# Patient Record
Sex: MALE | Race: WHITE | NOT HISPANIC OR LATINO | Employment: FULL TIME | URBAN - METROPOLITAN AREA
[De-identification: names, ages, dates, MRNs, and addresses within clinical notes are randomized per-mention and may not be internally consistent; named-entity substitution may affect disease eponyms.]

---

## 2023-06-21 LAB
APPEARANCE, URINE: CLEAR
BILIRUBIN, URINE: NEGATIVE
BLOOD, URINE: NEGATIVE
COLOR, URINE: YELLOW
GLUCOSE, URINE: NEGATIVE MG/DL
KETONES, URINE: NEGATIVE MG/DL
LEUKOCYTE ESTERASE, URINE: NEGATIVE
NITRITE, URINE: NEGATIVE
PH, URINE: 7 (ref 5–8)
PROTEIN, URINE: NEGATIVE MG/DL
SPECIFIC GRAVITY, URINE: 1.02 (ref 1–1.03)
UROBILINOGEN, URINE: <2 MG/DL (ref 0–1.9)

## 2023-06-22 LAB — URINE CULTURE: NORMAL

## 2023-10-10 ENCOUNTER — PHARMACY VISIT (OUTPATIENT)
Dept: PHARMACY | Facility: CLINIC | Age: 63
End: 2023-10-10
Payer: COMMERCIAL

## 2023-10-10 PROCEDURE — RXMED WILLOW AMBULATORY MEDICATION CHARGE

## 2023-11-29 ENCOUNTER — OFFICE VISIT (OUTPATIENT)
Dept: UROLOGY | Facility: HOSPITAL | Age: 63
End: 2023-11-29
Payer: COMMERCIAL

## 2023-11-29 ENCOUNTER — LAB (OUTPATIENT)
Dept: LAB | Facility: LAB | Age: 63
End: 2023-11-29
Payer: COMMERCIAL

## 2023-11-29 DIAGNOSIS — Z12.5 SCREENING FOR PROSTATE CANCER: ICD-10-CM

## 2023-11-29 DIAGNOSIS — N40.1 BPH WITH OBSTRUCTION/LOWER URINARY TRACT SYMPTOMS: Primary | ICD-10-CM

## 2023-11-29 DIAGNOSIS — N13.8 BPH WITH OBSTRUCTION/LOWER URINARY TRACT SYMPTOMS: Primary | ICD-10-CM

## 2023-11-29 LAB
POC APPEARANCE, URINE: CLEAR
POC BILIRUBIN, URINE: NEGATIVE
POC BLOOD, URINE: ABNORMAL
POC COLOR, URINE: YELLOW
POC GLUCOSE, URINE: NEGATIVE MG/DL
POC KETONES, URINE: NEGATIVE MG/DL
POC LEUKOCYTES, URINE: NEGATIVE
POC NITRITE,URINE: NEGATIVE
POC PH, URINE: 6.5 PH
POC PROTEIN, URINE: NEGATIVE MG/DL
POC SPECIFIC GRAVITY, URINE: 1.02
POC UROBILINOGEN, URINE: 0.2 EU/DL
PSA SERPL-MCNC: 0.84 NG/ML

## 2023-11-29 PROCEDURE — 84153 ASSAY OF PSA TOTAL: CPT

## 2023-11-29 PROCEDURE — 99213 OFFICE O/P EST LOW 20 MIN: CPT | Performed by: NURSE PRACTITIONER

## 2023-11-29 PROCEDURE — 36415 COLL VENOUS BLD VENIPUNCTURE: CPT

## 2023-11-29 PROCEDURE — 81003 URINALYSIS AUTO W/O SCOPE: CPT | Performed by: NURSE PRACTITIONER

## 2023-11-29 NOTE — PROGRESS NOTES
Subjective   Patient ID: Mario Alberto Trejo is a 63 y.o. male presenting for FUV     HPI  Hitory of BPH with LUTS, Hematuria presents for follow up visit. Patient states his urinary symptoms started around 42 years of age and have slowly progressed since. Main complaint is urinary frequency during the day. Trial of Tamsulosin in the past. Stopped taking due to dizziness. Low dose Tadalafil recommended. He did not take due to possible adverse effects. He did not start Finasteride after last visit.  NTF x 2-4. Last visit with myself 06/21/2023 Restarted Tamsulosin 0.4 mg at bedtime. Patient states that symptoms have improved slightly. Less urgency and frequency. He denies gross hematuria, dysuria, bothersome urinary frequency, or flank pain. History of kidney stones once in the past. Patient is a non smoker.     Denies a family history of prostate cancer. Reports father had a TURBT procedure   PSA was 0.84 on 6/21/22. Followed by PCP        Review of Systems  All other systems have been reviewed and are negative for complaint.    Objective   Physical Exam  General: Well developed, well nourished, alert and cooperative, appears in no acute distress  Eyes: Non-injected conjunctiva, sclera clear, no proptosis  Cardiac: Extremities are warm and well perfused. No edema, cyanosis or pallor.   Lungs: Breathing is easy, non-labored. Speaking in clear and complete sentences. Normal diaphragmatic movement.  MSK: Ambulatory with steady gait, unassisted  Neuro: alert and oriented to person, place and time  Psych: Demonstrates good judgement and reason, without hallucinations, abnormal affect or abnormal behaviors.  Skin: no obvious lesions, no rashes.    Assessment/Plan   Diagnoses and all orders for this visit:  BPH with obstruction/lower urinary tract symptoms  -     POCT UA Automated manually resulted  Screening for prostate cancer  -     Prostate Specific Antigen; Future    LUTS   I educated the patient on relevant lower urinary  tract anatomy and physiology with emphasis on differential diagnosis as well as contributing factors to the patient's lower urinary tract symptoms.     I educated the patient on dietary and behavioral modifications pertinent to the patient's complaints. I recommended to avoid caffeine, alcohol, spicy and acidic oral intake and to regulate fluid intake and voiding (timed voiding). I stressed the importance of avoiding constipation and recommended stool softeners unless diarrhea present. I explained the role of pelvic floor therapy in reducing lower urinary tract symptoms.     The mechanism of action as well as the risks, benefits, common side effects, and alternatives to all prescribed medications were discussed with the patient at length. The patient had the opportunity to ask questions and all questions were answered. I primarily discussed alpha blockers, 5ARIs, PDE5i, anticholinergics, and beta 3 agonist (mirabegron). I explained that alpha blockers help decrease bladder outlet resistance with potential side effects including retrograde ejaculation, rhinitis, and lightheadedness. I explained that 5 alpha reductase inhibitors can shrink the prostate up to 30%, can artificially decrease their PSA value by 50%, but take approximately 6-9 months to reach full efficacy and have potential side effects including decreased libido, impotence and breast tenderness.    Continue Tamsulosin 0.4 mg at bedtime.   Please obtain PSA now, I will call with results.     Patient will follow up yearly, sooner if needed.     All questions and concerns were addressed. Patient verbalizes understanding and has no other questions at this time. You are able to have email access to your chart. You can sign into Answer.To or add the [employer] [facility] venkat on your smart phone to review today's visit, laboratory work and imaging.     Latrice Cash-- ROSEMARIE LYONS  Office Phone:  [phone]

## 2023-12-08 ENCOUNTER — OFFICE VISIT (OUTPATIENT)
Dept: OPHTHALMOLOGY | Facility: CLINIC | Age: 63
End: 2023-12-08
Payer: COMMERCIAL

## 2023-12-08 DIAGNOSIS — Z98.890 HISTORY OF LASER REFRACTIVE SURGERY: ICD-10-CM

## 2023-12-08 DIAGNOSIS — H52.213 IRREGULAR ASTIGMATISM OF BOTH EYES: ICD-10-CM

## 2023-12-08 DIAGNOSIS — H25.812 COMBINED FORMS OF AGE-RELATED CATARACT OF LEFT EYE: Primary | ICD-10-CM

## 2023-12-08 DIAGNOSIS — H00.019 HORDEOLUM EXTERNUM, UNSPECIFIED LATERALITY: ICD-10-CM

## 2023-12-08 PROCEDURE — 99214 OFFICE O/P EST MOD 30 MIN: CPT | Performed by: OPHTHALMOLOGY

## 2023-12-08 RX ORDER — NEOMYCIN SULFATE, POLYMYXIN B SULFATE, AND DEXAMETHASONE 3.5; 10000; 1 MG/G; [USP'U]/G; MG/G
OINTMENT OPHTHALMIC 2 TIMES DAILY
Qty: 3.5 G | Refills: 1 | Status: SHIPPED | OUTPATIENT
Start: 2023-12-08 | End: 2024-01-07

## 2023-12-08 ASSESSMENT — TONOMETRY
OD_IOP_MMHG: 16
IOP_METHOD: TONOPEN
OS_IOP_MMHG: 15

## 2023-12-08 ASSESSMENT — CONF VISUAL FIELD
OS_INFERIOR_NASAL_RESTRICTION: 0
OD_SUPERIOR_TEMPORAL_RESTRICTION: 0
OS_INFERIOR_TEMPORAL_RESTRICTION: 0
OS_NORMAL: 1
OD_NORMAL: 1
METHOD: COUNTING FINGERS
OD_INFERIOR_TEMPORAL_RESTRICTION: 0
OD_SUPERIOR_NASAL_RESTRICTION: 0
OD_INFERIOR_NASAL_RESTRICTION: 0
OS_SUPERIOR_TEMPORAL_RESTRICTION: 0
OS_SUPERIOR_NASAL_RESTRICTION: 0

## 2023-12-08 ASSESSMENT — VISUAL ACUITY
OD_CC: 20/25-1
OS_CC: 20/30
METHOD: SNELLEN - LINEAR
CORRECTION_TYPE: CONTACTS

## 2023-12-08 ASSESSMENT — SLIT LAMP EXAM - LIDS
COMMENTS: NORMAL
COMMENTS: NORMAL

## 2023-12-08 ASSESSMENT — EXTERNAL EXAM - RIGHT EYE: OD_EXAM: NORMAL

## 2023-12-08 ASSESSMENT — EXTERNAL EXAM - LEFT EYE: OS_EXAM: NORMAL

## 2023-12-08 ASSESSMENT — ENCOUNTER SYMPTOMS: EYES NEGATIVE: 1

## 2023-12-08 NOTE — PROGRESS NOTES
Assessment/Plan   Diagnoses and all orders for this visit:  Combined forms of age-related cataract of left eye  Not Visually significant   Monitor for now  May do IC-8 lens at time of CE OS     Irregular astigmatism of both eyes  History of laser refractive surgery  S/p multiple refractive procedures OU with resulting haze and irregular cylinder (cyl)  Managed by Scleral [facility]     Hordeolum externum, unspecified laterality  -     neomycin-polymyxin B-dexameth (Polydex) 3.5 mg/g-10,000 unit/g-0.1 % ointment ophthalmic ointment; Apply to both eyes 2 times a day.    Fu prn for CE OS

## 2023-12-11 PROCEDURE — RXMED WILLOW AMBULATORY MEDICATION CHARGE

## 2023-12-12 ENCOUNTER — PHARMACY VISIT (OUTPATIENT)
Dept: PHARMACY | Facility: CLINIC | Age: 63
End: 2023-12-12
Payer: COMMERCIAL

## 2023-12-15 ENCOUNTER — TELEPHONE (OUTPATIENT)
Dept: OPHTHALMOLOGY | Facility: CLINIC | Age: 63
End: 2023-12-15
Payer: COMMERCIAL

## 2023-12-21 ENCOUNTER — APPOINTMENT (OUTPATIENT)
Dept: SLEEP MEDICINE | Facility: HOSPITAL | Age: 63
End: 2023-12-21
Payer: COMMERCIAL

## 2023-12-27 PROCEDURE — RXMED WILLOW AMBULATORY MEDICATION CHARGE

## 2023-12-29 ENCOUNTER — PHARMACY VISIT (OUTPATIENT)
Dept: PHARMACY | Facility: CLINIC | Age: 63
End: 2023-12-29
Payer: COMMERCIAL

## 2024-03-14 ENCOUNTER — APPOINTMENT (OUTPATIENT)
Dept: SLEEP MEDICINE | Facility: HOSPITAL | Age: 64
End: 2024-03-14
Payer: COMMERCIAL

## 2024-04-15 ENCOUNTER — TELEPHONE (OUTPATIENT)
Dept: OPHTHALMOLOGY | Facility: CLINIC | Age: 64
End: 2024-04-15

## 2024-04-19 ENCOUNTER — PHARMACY VISIT (OUTPATIENT)
Dept: PHARMACY | Facility: CLINIC | Age: 64
End: 2024-04-19
Payer: COMMERCIAL

## 2024-04-19 PROCEDURE — RXMED WILLOW AMBULATORY MEDICATION CHARGE

## 2024-06-06 ENCOUNTER — OFFICE VISIT (OUTPATIENT)
Dept: SLEEP MEDICINE | Facility: HOSPITAL | Age: 64
End: 2024-06-06
Payer: COMMERCIAL

## 2024-06-06 VITALS
HEART RATE: 63 BPM | TEMPERATURE: 97.3 F | WEIGHT: 190 LBS | DIASTOLIC BLOOD PRESSURE: 86 MMHG | OXYGEN SATURATION: 95 % | SYSTOLIC BLOOD PRESSURE: 131 MMHG | BODY MASS INDEX: 25.77 KG/M2

## 2024-06-06 DIAGNOSIS — G47.33 OSA (OBSTRUCTIVE SLEEP APNEA): ICD-10-CM

## 2024-06-06 DIAGNOSIS — G47.19 EXCESSIVE DAYTIME SLEEPINESS: ICD-10-CM

## 2024-06-06 DIAGNOSIS — F41.9 ANXIETY: Primary | ICD-10-CM

## 2024-06-06 PROCEDURE — RXMED WILLOW AMBULATORY MEDICATION CHARGE

## 2024-06-06 PROCEDURE — 99214 OFFICE O/P EST MOD 30 MIN: CPT | Performed by: STUDENT IN AN ORGANIZED HEALTH CARE EDUCATION/TRAINING PROGRAM

## 2024-06-06 PROCEDURE — G2211 COMPLEX E/M VISIT ADD ON: HCPCS | Performed by: STUDENT IN AN ORGANIZED HEALTH CARE EDUCATION/TRAINING PROGRAM

## 2024-06-06 RX ORDER — FLUTICASONE PROPIONATE 50 MCG
SPRAY, SUSPENSION (ML) NASAL
COMMUNITY
Start: 2014-04-25

## 2024-06-06 RX ORDER — BUPROPION HYDROCHLORIDE 75 MG/1
1 TABLET ORAL DAILY
COMMUNITY
End: 2024-06-06 | Stop reason: SDUPTHER

## 2024-06-06 RX ORDER — BUPROPION HYDROCHLORIDE 75 MG/1
75 TABLET ORAL DAILY
Qty: 90 TABLET | Refills: 3 | Status: SHIPPED | OUTPATIENT
Start: 2024-06-06 | End: 2025-06-06

## 2024-06-06 RX ORDER — CHOLECALCIFEROL (VITAMIN D3) 50 MCG
1 TABLET ORAL DAILY
COMMUNITY
Start: 2023-01-19

## 2024-06-06 RX ORDER — PHENYLPROPANOLAMINE/CLEMASTINE 75-1.34MG
TABLET, EXTENDED RELEASE ORAL
COMMUNITY
Start: 2015-09-29

## 2024-06-06 SDOH — ECONOMIC STABILITY: FOOD INSECURITY: WITHIN THE PAST 12 MONTHS, THE FOOD YOU BOUGHT JUST DIDN'T LAST AND YOU DIDN'T HAVE MONEY TO GET MORE.: NEVER TRUE

## 2024-06-06 SDOH — ECONOMIC STABILITY: FOOD INSECURITY: WITHIN THE PAST 12 MONTHS, YOU WORRIED THAT YOUR FOOD WOULD RUN OUT BEFORE YOU GOT MONEY TO BUY MORE.: NEVER TRUE

## 2024-06-06 ASSESSMENT — LIFESTYLE VARIABLES
SKIP TO QUESTIONS 9-10: 1
HOW MANY STANDARD DRINKS CONTAINING ALCOHOL DO YOU HAVE ON A TYPICAL DAY: 1 OR 2
HOW OFTEN DO YOU HAVE SIX OR MORE DRINKS ON ONE OCCASION: NEVER
AUDIT-C TOTAL SCORE: 1
HOW OFTEN DO YOU HAVE A DRINK CONTAINING ALCOHOL: MONTHLY OR LESS

## 2024-06-06 ASSESSMENT — SLEEP AND FATIGUE QUESTIONNAIRES
ESS-CHAD TOTAL SCORE: 8
HOW LIKELY ARE YOU TO NOD OFF OR FALL ASLEEP IN A CAR, WHILE STOPPED FOR A FEW MINUTES IN TRAFFIC: SLIGHT CHANCE OF DOZING
HOW LIKELY ARE YOU TO NOD OFF OR FALL ASLEEP WHILE SITTING QUIETLY AFTER LUNCH WITHOUT ALCOHOL: WOULD NEVER DOZE
SLEEP_PROBLEM_NOTICEABLE_TO_OTHERS: SOMEWHAT
HOW LIKELY ARE YOU TO NOD OFF OR FALL ASLEEP WHILE SITTING AND TALKING TO SOMEONE: WOULD NEVER DOZE
HOW LIKELY ARE YOU TO NOD OFF OR FALL ASLEEP WHEN YOU ARE A PASSENGER IN A CAR FOR AN HOUR WITHOUT A BREAK: SLIGHT CHANCE OF DOZING
HOW LIKELY ARE YOU TO NOD OFF OR FALL ASLEEP WHILE WATCHING TV: MODERATE CHANCE OF DOZING
SATISFACTION_WITH_CURRENT_SLEEP_PATTERN: SATISFIED
SITING INACTIVE IN A PUBLIC PLACE LIKE A CLASS ROOM OR A MOVIE THEATER: SLIGHT CHANCE OF DOZING
WAKING_TOO_EARLY: MODERATE
DIFFICULTY_STAYING_ASLEEP: MILD
SLEEP_PROBLEM_INTERFERES_DAILY_ACTIVITIES: SOMEWHAT
HOW LIKELY ARE YOU TO NOD OFF OR FALL ASLEEP WHILE LYING DOWN TO REST IN THE AFTERNOON WHEN CIRCUMSTANCES PERMIT: MODERATE CHANCE OF DOZING
WORRIED_DISTRESSED_DUE_TO_SLEEP: A LITTLE
HOW LIKELY ARE YOU TO NOD OFF OR FALL ASLEEP WHILE SITTING AND READING: SLIGHT CHANCE OF DOZING

## 2024-06-06 ASSESSMENT — ENCOUNTER SYMPTOMS
NEUROLOGICAL NEGATIVE: 1
CARDIOVASCULAR NEGATIVE: 1
RESPIRATORY NEGATIVE: 1
CONSTITUTIONAL NEGATIVE: 1
PSYCHIATRIC NEGATIVE: 1

## 2024-06-06 ASSESSMENT — PATIENT HEALTH QUESTIONNAIRE - PHQ9
2. FEELING DOWN, DEPRESSED OR HOPELESS: NOT AT ALL
1. LITTLE INTEREST OR PLEASURE IN DOING THINGS: NOT AT ALL
SUM OF ALL RESPONSES TO PHQ9 QUESTIONS 1 & 2: 0

## 2024-06-06 NOTE — ASSESSMENT & PLAN NOTE
Still reports some EDS despite use of CPAP.  He felt like CPAP helped but he is still not 100%.      We discussed about the use of stimulants or WPA in much detail and he wants to think on it and maybe get some feedback from spouse and coworkers, friends and see if he wants to go this route

## 2024-06-06 NOTE — PROGRESS NOTES
Patient: Mario Alberto Trejo    [id number]  : 1960 -- AGE 63 y.o.    Provider: Jf Story MD     Location [facility]   Service Date: 2024              [facility]  Followup Visit Note    HISTORY OF PRESENT ILLNESS     HISTORY OF PRESENT ILLNESS   Mario Alberto Trejo is a 63 y.o. male with h/o BJ and Anxiety who presents to a [facility] for followup.     Assessment and plan from last visit: 2024    Patient is 61 y/o M with PMHx of depression who presents for f/u with:     # BJ/insufficient sleep  - reports improvement w/ sleep, and less dry and morning headache while using PAP  - continue using NP and continue w/ PAP therapy  - continue to monitor compliance  - discussed increasing sleep duration as he is getting <6 hours/night in setting of ESS = 9--if able to increase duration of sleep and still having symptoms, we did discuss possibility of starting wake-promoting agent in the future     # Depression  # Memory Concern  - discussed starting wake-promoting agent vs. Neuro referral vs. increasing wellbutrin today to help with memory concern--he is doing well, less irritable with Wellbutrin--no SI/HI  - would like to initially start w/ increased dose of wellbutrin--will increase from wellbutrin 75mg --> 150mg to see if it helps w/ memory improvement if 2/2 depression  - if no improvement, we will consider neurology referral  - continue with PAP as continued use will also likely benefit memory      F/u in 4 months     Current History    On today's visit, the patient reports that he has been doing well with PAP and now adequate sleep but feel like his sleepiness has improved but not 100% recovered.  He is otherwise doing well.  He feels like his anxiety is fairly well controlled by Wellbutrin 75mg daily.  He has no issues with his CPAP machine.    PAP Info  DURABLE MEDICAL EQUIPMENT COMPANY: [employer]  Machine:  THERAPY: RESMED AIRSENSE 11  6 cm H2O - 12 cm H2O   Issues with therapy: ISSUES WITH THERAPY: None  Benefits with PAP: PERCEIVED BENEFITS OF PAP: refreshing sleep    RLS Followup:   none.    Daytime Symptoms    Patient reports DAYTIME SYMPTOMS: excessively sleepy during the day  Patient denies daytime symptoms including: Denies: feeling sleepy when driving    Naps: No  Fatigue: symptoms bothersome, but easily able to carry out all usual work/school/family activities    ESS: 8  BOBBI: 9  FOSQ: 34    REVIEW OF SYSTEMS     REVIEW OF SYSTEMS  Review of Systems   Constitutional: Negative.    HENT: Negative.     Respiratory: Negative.     Cardiovascular: Negative.    Genitourinary: Negative.    Skin: Negative.    Neurological: Negative.    Psychiatric/Behavioral: Negative.           ALLERGIES AND MEDICATIONS     ALLERGIES  No Known Allergies    MEDICATIONS: He has a current medication list which includes the following prescription(s): cholecalciferol - Take 1 tablet (2,000 Units) by mouth once daily, fluticasone - Administer into affected nostril(s), ibuprofen - Take by mouth, tamsulosin - TAKE 1 CAPSULE BY MOUTH AT BEDTIME, and bupropion - Take 1 tablet (75 mg) by mouth once daily.    PAST MEDICAL HISTORY : He  has a past medical history of Other symptoms and signs involving the nervous system (07/29/2019), Personal history of urinary calculi (04/25/2014), and Unilateral inguinal hernia, without obstruction or gangrene, not specified as recurrent (10/13/2014).    PAST SURGICAL HISTORY: He  has a past surgical history that includes Vasectomy (04/25/2014); Colonoscopy (05/23/2018); Refractive surgery (05/23/2018); and Hernia repair (09/29/2015).     FAMILY HISTORY: No changes since previous visit. Otherwise non-contributory as charted.     SOCIAL HISTORY  He  reports that he has never smoked. He does not have any smokeless tobacco history on file. He reports current alcohol use. He reports that he does not use drugs.        PHYSICAL EXAM     VITAL SIGNS: /86   Pulse 63   Temp 36.3 °C (97.3 °F)   Wt 86.2 kg (190 lb)   SpO2 95%   BMI 25.77 kg/m²      PREVIOUS WEIGHTS:  Wt Readings from Last 3 Encounters:   06/06/24 86.2 kg (190 lb)   08/24/23 83.9 kg (185 lb)   06/21/23 83 kg (183 lb)         RESULTS/DATA     Bicarbonate (mmol/L)   Date Value   06/21/2022 31   05/17/2018 30   04/14/2018 31       PAP Adherence  A PAP adherence download was obtained and data was reviewed personally today in clinic.    ASSESSMENT/PLAN     Mr. Trejo is a 63 y.o. male and he returns in followup to the [facility] for BJ and Anxiety.    Problem List, Orders, Assessment, Recommendations:  Problem List Items Addressed This Visit             ICD-10-CM    BJ (obstructive sleep apnea) G47.33     - doing well with PAP therapy  - continue current setting  - renew PAP supply orders           Relevant Orders    Positive Airway Pressure (PAP) Therapy    Anxiety - Primary F41.9     Renew wellbutrin         Relevant Medications    buPROPion (Wellbutrin) 75 mg tablet    Excessive daytime sleepiness G47.19     Still reports some EDS despite use of CPAP.  He felt like CPAP helped but he is still not 100%.      We discussed about the use of stimulants or WPA in much detail and he wants to think on it and maybe get some feedback from spouse and coworkers, friends and see if he wants to go this route            Disposition    Return to clinic in 12 months

## 2024-06-07 ENCOUNTER — PHARMACY VISIT (OUTPATIENT)
Dept: PHARMACY | Facility: CLINIC | Age: 64
End: 2024-06-07
Payer: COMMERCIAL

## 2024-07-15 DIAGNOSIS — N40.1 BPH WITH OBSTRUCTION/LOWER URINARY TRACT SYMPTOMS: Primary | ICD-10-CM

## 2024-07-15 DIAGNOSIS — N13.8 BPH WITH OBSTRUCTION/LOWER URINARY TRACT SYMPTOMS: Primary | ICD-10-CM

## 2024-07-15 PROCEDURE — RXMED WILLOW AMBULATORY MEDICATION CHARGE

## 2024-07-15 RX ORDER — TAMSULOSIN HYDROCHLORIDE 0.4 MG/1
0.4 CAPSULE ORAL NIGHTLY
Qty: 90 CAPSULE | Refills: 3 | Status: SHIPPED | OUTPATIENT
Start: 2024-07-15 | End: 2025-07-15

## 2024-07-16 ENCOUNTER — PHARMACY VISIT (OUTPATIENT)
Dept: PHARMACY | Facility: CLINIC | Age: 64
End: 2024-07-16
Payer: COMMERCIAL

## 2024-09-20 ENCOUNTER — APPOINTMENT (OUTPATIENT)
Dept: OPHTHALMOLOGY | Facility: CLINIC | Age: 64
End: 2024-09-20
Payer: COMMERCIAL

## 2024-09-20 DIAGNOSIS — H02.883 MEIBOMIAN GLAND DISEASE OF RIGHT EYE: ICD-10-CM

## 2024-09-20 DIAGNOSIS — H25.812 COMBINED FORMS OF AGE-RELATED CATARACT OF LEFT EYE: ICD-10-CM

## 2024-09-20 DIAGNOSIS — H52.31 ANISOMETROPIA: Primary | ICD-10-CM

## 2024-09-20 DIAGNOSIS — Z98.890 HISTORY OF LASER REFRACTIVE SURGERY: ICD-10-CM

## 2024-09-20 DIAGNOSIS — H02.886 MEIBOMIAN GLAND DISEASE OF LEFT EYE: ICD-10-CM

## 2024-09-20 DIAGNOSIS — Z96.1 PRESENCE OF INTRAOCULAR LENS: ICD-10-CM

## 2024-09-20 DIAGNOSIS — H52.213 IRREGULAR ASTIGMATISM OF BOTH EYES: ICD-10-CM

## 2024-09-20 PROCEDURE — 92014 COMPRE OPH EXAM EST PT 1/>: CPT | Performed by: OPTOMETRIST

## 2024-09-20 PROCEDURE — 92025 CPTRIZED CORNEAL TOPOGRAPHY: CPT | Performed by: OPTOMETRIST

## 2024-09-20 PROCEDURE — V2521 CNTCT LENS HYDROPHILIC TORIC: HCPCS | Performed by: OPTOMETRIST

## 2024-09-20 PROCEDURE — 92310 CONTACT LENS FITTING OU: CPT | Performed by: OPTOMETRIST

## 2024-09-20 ASSESSMENT — REFRACTION_MANIFEST
OD_SPHERE: -1.50
OD_CYLINDER: -0.75
OS_AXIS: 105
OD_CYLINDER: SPHERE
OS_CYLINDER: -1.25
OD_AXIS: 036
OS_AXIS: 075
OD_SPHERE: +0.00
OS_SPHERE: -1.00
OS_SPHERE: -4.50
OS_CYLINDER: -1.25

## 2024-09-20 ASSESSMENT — CONF VISUAL FIELD
OS_SUPERIOR_TEMPORAL_RESTRICTION: 0
OD_SUPERIOR_TEMPORAL_RESTRICTION: 0
OD_INFERIOR_NASAL_RESTRICTION: 0
METHOD: COUNTING FINGERS
OS_NORMAL: 1
OD_SUPERIOR_NASAL_RESTRICTION: 0
OD_NORMAL: 1
OS_SUPERIOR_NASAL_RESTRICTION: 0
OS_INFERIOR_NASAL_RESTRICTION: 0
OD_INFERIOR_TEMPORAL_RESTRICTION: 0
OS_INFERIOR_TEMPORAL_RESTRICTION: 0

## 2024-09-20 ASSESSMENT — REFRACTION_CURRENTRX
OS_CYLINDER: -0.75
OS_SPHERE: -4.25
OS_BASECURVE: 8.7
OD_DIAMETER: 16.0
OD_SPHERE: -1.75
OS_DIAMETER: 14.5
OD_BASECURVE: 8.4
OS_AXIS: 090
OS_BRAND: BIOFINITY TORIC

## 2024-09-20 ASSESSMENT — EXTERNAL EXAM - LEFT EYE: OS_EXAM: NORMAL

## 2024-09-20 ASSESSMENT — CUP TO DISC RATIO
OS_RATIO: .3
OD_RATIO: .3

## 2024-09-20 ASSESSMENT — VISUAL ACUITY
OS_CC: 20/20-3
VA_OR_OD_CURRENT_RX: 20/25+1
OS_CC: 20/25-2
CORRECTION_TYPE: CONTACTS
VA_OR_OS_CURRENT_RX: 20/25-2
METHOD: SNELLEN - LINEAR
OD_CC: 20/30+2
OD_CC: 20/20-3

## 2024-09-20 ASSESSMENT — TONOMETRY
IOP_METHOD: GOLDMANN APPLANATION
OS_IOP_MMHG: 18
OD_IOP_MMHG: 19

## 2024-09-20 ASSESSMENT — ENCOUNTER SYMPTOMS: EYES NEGATIVE: 1

## 2024-09-20 ASSESSMENT — EXTERNAL EXAM - RIGHT EYE: OD_EXAM: NORMAL

## 2024-09-20 NOTE — PROGRESS NOTES
Assessment/Plan   Diagnoses and all orders for this visit:  Anisometropia  History of laser refractive surgery  Irregular astigmatism of both eyes  -     Corneal Topography - OU - Both Eyes  Combined forms of age-related cataract of left eye  Presence of intraocular lens    Updated MRX, did not recommend filling due to high anisometropia and history of non adaptation to glasses.     Finalized CL RX. Pt has new OD lens at home in same parameters, recommended to start wearing it due to haze/deposits on current lens. Will order OS lens to be shipped to patient's address.  Encouraged proper lens hygiene.     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 Pt has Polydex from Dr. Leiva that he uses.   Recommend hot compresses with lid massage bid OU. Recommend lid cleansing qhs Both eyes with tea tree oil wipes and following with Avenova. Discussed that this is a chronic problem which requires chronic/consistent treatment. Educated that Xdemvy is an option in the future if no improvement.     RTC in 1 year for CEE or sooner prn.

## 2024-09-20 NOTE — Clinical Note
Left eye (OS) Contact Lens Order  Quantity: 2 Package: 6 PK Appointment needed? Yes Medically necessary? Yes Ship To: Home Additional instructions: order 2 6pack LEFT eye only in updated FINAL RX, these should be medically necessary due to anisometropia from cataract surgery in Right eye, wife says the Right eye scleral lens was never covered.  Let me know if I can provide a letter to help that

## 2024-09-20 NOTE — PATIENT INSTRUCTIONS
"  Patient Information on Blepharitis/Meibomian Gland Dysfunction   (MGD)/Dry EYE    DRY EYE IS A CHRONIC PROBLEM THAT REQUIRES CHRONIC, CONSISTENT TREATMENT.  Symptoms include:  eye and eyelid irritation (foreign body sensation like an eyelash or grain of sand);  itchiness of the eye;  stinging/burning of the eye;     dryness of the eye;  tearing of the eyes;  red or bloodshot eyes;   blurry vision;  frequent “styes”  waxing and waning symptoms  eye fatigue and heaviness    What is Blepharitis?     Blepharitis comes from the [place] root “Bleph” meaning eyelid; “itis\" means inflammation.  So blepharitis is persistent in?ammation of the eyelids. Meibomian gland dysfunction is inflammation of the eyelid glands (MGD).      WHAT CAUSES BLEPHARITIS?    With the age of technology, blepharitis and dry eye has become more common.  This condition frequently occurs with computer use, frequent reading and intense visual use.  It is also common in people who have a tendency towards oily skin and dandruff. With blepharitis, both the upper and lower eyelids become coated with oily particles and bacteria near the base of the eyelashes.     Everyone has bacteria on the surface of their skin, but in some people, bacteria thrive in the skin at the base of the eyelashes. Large amounts of bacteria around the eyelashes can cause dandruff-like scales and particles to form along the lashes and eyelid margins. Blepharitis also is associated with meibomitis--dysfunction and in?ammation of the nearby oil glands of the eyelids (called meibomian glands).  This can commonly be associated with facial rosacea.    WHAT IS ROSACEA?    Rosacea (pronounced hper-FL-scho) is a chronic disease that affects both the skin and the eyelids. People with rosacea affecting their skin may ?ush easily and have redness and/or acne-like symptoms on their nose, cheeks, chin or forehead. More than half of people with rosacea affecting their skin have some symptoms of " ocular rosacea.     However, some people may have ocular rosacea without showing any skin symptoms. Approximately 13 million people in the [place] have rosacea. It usually occurs in adults (especially women) between the ages of 30 and 60. Although people of any skin color can develop rosacea, it tends to occur most frequently in people with fair skin.    WHAT CAUSES ROSACEA?   Scientists do not know what causes rosacea, but some think that genetics and environment may play a role. Some researchers believe that rosacea is a disorder that involves swelling of the blood vessels, resulting in ?ushing and redness. Other scientists think that a microscopic organism or mite in tiny facial hair follicles may clog the skin’s oil gland openings. Several factors are known to aggravate (but not cause) rosacea, including exposure to heat, sunlight, wind and cold; strenuous physical activity; drinking alcohol; consuming hot drinks or spicy foods; experiencing emotional stress; or coughing for long periods.  You may also be sensitive to particular foods in your diet.    WHAT IS MEIBOMIAN GLAND DYSFUNCTION (MGD)?  MGD, also termed posterior blepharitis, is the most common form of lid margin disease. In the early stages, patients are often asymptomatic, but if left unmanaged, MGD can cause or exacerbate dry eye symptoms and eyelid inflammation. The oil glands become blocked with thickened secretions. Chronically clogged glands eventually become unable to secrete oil which results in permanent changes in the tear film and dry eyes.    WHAT IS DRY EYE?  Dry eye is a multifactorial disease which disrupts the tear film and surface of the eye often resulting in symptoms of blurred vision (intermittent or constant) and discomfort (gritty/burning). If untreated dry eye can cause damage to the surface of the eye (cornea and conjunctiva). Dry eye is more common in women and tends to worsen with age. Dry eye can be caused by contact  lens over-wear, certain medications, systemic diseases, and can be worsened by ocular surgery (i.e. cataract surgery). A successful treatment plan for dry eye includes managing patient symptoms and reducing clinical signs.    WHAT MAKES UP A NORMAL TEAR FILM?    The normal tear film is made up of many components: Electrolytes, Proteins, Cytokines, Mucins, Anti-microbials, Latent Proteases, IgA, IgG, IgM, Polar Phospholipid. All of the normal tear components work to help keep the surface of the healthy, prevent infection, and heal any damaged tissue.    HOW ARE BLEPHARITIS, MGD, ROSACEA, AND DRY EYE TREATED?   These are all chronic conditions that cannot be cured but can be controlled with treatment:   Avoid eye drops that “get the red out.”  These drops can appear to improve symptoms of redness initially, but they cause the symptoms to worsen quickly.      Lubricant Eye Drops, Gels and Ointments (Dahlia?cial tears): Similar to brushing your teeth to prevent cavities, this is best as prevention for dry eye and in?ammation. Use ~4x a day.  If you occasionally or continually experience further symptoms, switch to a thicker artificial tear (lower on the list) 4x a day or another product in the same category. Your doctor may suggest preservative-free tears if you use drops more than 4X per day or you use other preserved drops such as for glaucoma.      Preserved Lubricant Eye Drops, Gels and Ointments  Thin: Refresh Tears, Tears Naturale Forte, Tears Naturale II, Blink Tears, Thera Tears, Moisture Eyes, GenTeal Mild, Hypo Tears, Akwa Tears, Tears Again, Isopto Tears, Soothe Hydration, Systane Ultra  Thick: Refresh Optive, Refresh Liquigel, Refresh Optive Gel Drops, Blink Gel Tears, Systane Balance, Systane Gel Drops, Tears Again Night and Day Gel, GenTeal Geldrops.  Thickest: Refresh Lacrilube ointment, Hypotears ointment, Tears Again ointment, Refresh PM ointment, GenTeal PM, Tears Naturale PM, Soothe Night Time ointment,  Puralube, Systane Gel  Oil based: Refresh Optive Advanced, Refresh Digital, Blink Triple Care    Preservative Free Lubricant Eye Drops or Gels (This avoids potential irritation from preservatives, though it is more costly, since they are non-preserved, use or discard single use vial within 24 hours)  Thin: Refresh Plus, Refresh Classic, Tears Naturale Free, Bion Tears, Blink Tears, Thera Tears, Systane Preservative Free, Nature’s Tears Eye Mist, Soothe, Similasan iVizia  Thick: Refresh Celluvisc, Refresh Optive Sensitive, Systane Ultra Preservative Free, TheraTears Liquid gel, Refresh PM Ointment.  Oil Based: Refresh Optive Bari-3, Systane Complete, Retaine MGD, Refresh Digital     Ointments: Pull the lower lid down to create a small pocket. Apply less than a grain of rice size amount into the pocket without touching the tip to the eye. Once placed, blink rapidly for approximately 2 minutes to distribute the ointment and eliminate the blur.    Warm compresses: Warm compresses help to clear the clogged gland. This will loosen debris around your eyelashes as well as opening the pores. It also helps thin oil secretions from nearby oil glands, preventing the development of a stye -- an enlarged lump caused by clogged oil secretions in the eyelid.  Using a microwave, heat the compress mask for 20-30 (follow package instructions) *preferred option  Ensure that the heat is not too hot to burn the back of the hand.   Apply to the lids for 8-15 minutes, rewarming to maintain adequate heat.  Alternatively, soak a clean washcloth in hot water; wring out cloth so it is warm and moist, however, this will need to be reheated every 1-2 minutes.  Some patients may note lid dryness, if this occurs, you may consider using ointment on the lid prior to performing the warm compress or placing a thin piece of fabric between the compress and eyelids.    LID MASSAGE:  Apply light pressure with your index finger to the lid margin near the  lash line.  Roll the finger upward on the lower lid while looking up, then roll the finger downward on the upper lid while looking down. Perform lid massage only after the warm compress typically once-twice per day. This can also be done in a circular motion.    Eyelid scrubs:  Avenova, this is a prescription antimicrobial lid application  Spray Avenova on a clean finger or lint free pad.   Gently scrub the base of your eyelashes with your eyes closed, do not rinse.  Ocusoft Hypochlor  Spray on a clean finger or lint free pad.   Gently scrub the base of your eyelashes with your eyes closed, do not rinse.    I Lid ‘n Lash Plus, this is a non-prescription tea-tree oil based lid scrub  Take 1 towelette from case.    Gently scrub the base of your eyelashes with your eyes closed.  Do not rinse, you may use other side of towelette for the other eye.  Rub the remaining over the forehead, cheeks, nose and eyebrows  You will feel a cool, menthol sensation.  You may rinse the remaining solution off or let it dry    I Lid ‘n Lash, this is a non-prescription oil based lid application  Take 1 towelette from case  Gently scrub the base of your eyelashes with your eyes closed.  Do not rinse, you may use other side of towelette for the other eye.  You may rinse the remaining solution off or let it dry.    Ocusoft Lid Scrubs Plus  Open one towelette.    Gently wipe the base of your eyelashes with your eyes closed.  Do not rinse, you may use other side of towelette for the other eye or use a fresh wipe    Baby Shampoo  Mix ½ warm water with ½ baby shampoo.  Dip a clean washcloth, cotton swab or commercial lint-free pad into the mixture.  Gently scrub the base of your eyelashes with your eyes closed,   Rinse with water.    Eyelid scrubs should only be done after warm compresses.  This is similar to cleaning cold butter from a frying pan. You need to melt the oils first with an adequate amount of time and heat and then scrub out any  remaining dirt, debris, or any bad oils. Since you cannot make your eyelids as hot as the stove, you need more time.    Diet change:  Reduce you intake of fried/fatty foods.  Also reduce your intake of processed foods.  This should be substituted with salmon, tuna, and walnuts which provide healthy fats.      Antibiotic ophthalmic ointment: your doctor may prescribe an antibiotic ophthalmic gel or ointment such as Azithromycin, Bacitracin or Erythromycin to be placed on the eyelids following the warm compresses and lid scrubs, this ointment will be safe to put in the eye.                  To treat your DRY EYE, blepharitis and/OR MGD:    Treatment Dose or Number of times daily   Warm compresses  MGDRx Bag  Solis  Thermalon  EyeEco   1-2X daily for 8-15 minutes   Lid massage   1-2X daily   Lid Scrubs/Applications  Avenova (Amazon)  I-Lid ‘n Lash/Plus  Ocusoft Lid Wipes  Baby Shampoo  Other product____   1-2X daily   Xdemvy (lotilaner) 1 drop 2x daily (12 hrs apart) for 6 weeks   Lubricant Eye Drops, Gels, or Ointments [Preservative Free]   3-4X daily AND OR Gel / Ointment Nightly   Miebo (perfluorohexyloctane) 1 drop 4x daily   Restasis / Xiidra / Cequa/ cyclosporine 0.05% 1 drop in each eye 2x daily  *side effects burning/stinging, redness, intermittent blur, strange taste*     Antibiotic Gel or Ointment,  Doxycycline Tablets   Every other day 1X daily 2X daily   Steroid: Prednisolone Acetate, Fluoromethalone (FML), Loteprednol (Lotemax), Eysuvius   R / L / Both 4X/day  3X/day  2X/day  1X/day ____ weeks    ____ days   Tyrvaya 1 spray into side wall of each nostril with gentle inhale 2x/day  *side effects sneezing, strange taste   Autologous Serum Tears  20% ; 50% ; other 1 drop into each eye: 2-4xday,  4-6x/day,  6+ x/day

## 2024-10-23 PROCEDURE — RXMED WILLOW AMBULATORY MEDICATION CHARGE

## 2024-10-26 ENCOUNTER — PHARMACY VISIT (OUTPATIENT)
Dept: PHARMACY | Facility: CLINIC | Age: 64
End: 2024-10-26
Payer: COMMERCIAL

## 2024-11-27 ENCOUNTER — APPOINTMENT (OUTPATIENT)
Dept: UROLOGY | Facility: HOSPITAL | Age: 64
End: 2024-11-27
Payer: COMMERCIAL

## 2024-11-27 DIAGNOSIS — N13.8 BPH WITH OBSTRUCTION/LOWER URINARY TRACT SYMPTOMS: ICD-10-CM

## 2024-11-27 DIAGNOSIS — Z12.5 SCREENING FOR PROSTATE CANCER: Primary | ICD-10-CM

## 2024-11-27 DIAGNOSIS — N40.1 BPH WITH OBSTRUCTION/LOWER URINARY TRACT SYMPTOMS: ICD-10-CM

## 2024-11-27 DIAGNOSIS — R31.21 ASYMPTOMATIC MICROSCOPIC HEMATURIA: ICD-10-CM

## 2024-11-27 LAB
APPEARANCE UR: CLEAR
BILIRUB UR STRIP.AUTO-MCNC: NEGATIVE MG/DL
COLOR UR: NORMAL
GLUCOSE UR STRIP.AUTO-MCNC: NORMAL MG/DL
KETONES UR STRIP.AUTO-MCNC: NEGATIVE MG/DL
LEUKOCYTE ESTERASE UR QL STRIP.AUTO: NEGATIVE
NITRITE UR QL STRIP.AUTO: NEGATIVE
PH UR STRIP.AUTO: 6.5 [PH]
POC APPEARANCE, URINE: CLEAR
POC BILIRUBIN, URINE: NEGATIVE
POC BLOOD, URINE: ABNORMAL
POC COLOR, URINE: YELLOW
POC GLUCOSE, URINE: NEGATIVE MG/DL
POC KETONES, URINE: NEGATIVE MG/DL
POC LEUKOCYTES, URINE: NEGATIVE
POC NITRITE,URINE: NEGATIVE
POC PH, URINE: 7 PH
POC PROTEIN, URINE: NEGATIVE MG/DL
POC SPECIFIC GRAVITY, URINE: 1.01
POC UROBILINOGEN, URINE: 0.2 EU/DL
PROT UR STRIP.AUTO-MCNC: NEGATIVE MG/DL
RBC # UR STRIP.AUTO: NEGATIVE /UL
SP GR UR STRIP.AUTO: 1.01
UROBILINOGEN UR STRIP.AUTO-MCNC: NORMAL MG/DL

## 2024-11-27 PROCEDURE — 51798 US URINE CAPACITY MEASURE: CPT | Performed by: NURSE PRACTITIONER

## 2024-11-27 PROCEDURE — 87086 URINE CULTURE/COLONY COUNT: CPT

## 2024-11-27 PROCEDURE — G2211 COMPLEX E/M VISIT ADD ON: HCPCS | Performed by: NURSE PRACTITIONER

## 2024-11-27 PROCEDURE — 99213 OFFICE O/P EST LOW 20 MIN: CPT | Performed by: NURSE PRACTITIONER

## 2024-11-27 PROCEDURE — 81003 URINALYSIS AUTO W/O SCOPE: CPT

## 2024-11-27 PROCEDURE — 81003 URINALYSIS AUTO W/O SCOPE: CPT | Performed by: NURSE PRACTITIONER

## 2024-11-27 NOTE — PROGRESS NOTES
[facility]  Outpatient Clinic Note    Subjective   Mario Alberto Trejo is a 64 y.o. male    History of Present Illness   Patient presenting to clinic today for annual FUV. History of BPH with LUTS, Hematuria presents for follow up visit. Patient states his urinary symptoms started around 42 years of age and have slowly progressed since. Main complaint is urinary frequency during the day. Low dose Tadalafil recommended. He did not take due to possible adverse effects.   Stopped taking Finasteride after last visit.  NTF x 2-3, not bothersome  Restarted Tamsulosin 0.4 mg at bedtime. Patient states that symptoms have improved slightly. Less urgency and frequency. He denies gross hematuria, dysuria, bothersome urinary frequency, or flank pain. No ED concerns   History of kidney stones once in the past.   Patient is a non smoker. No alcohol use    Lab Results   Component Value Date    PSA 0.84 11/29/2023    PSA 0.81 05/17/2018     Past Medical History and Surgical History   Past Medical History:   Diagnosis Date    History of refractive surgery     Other symptoms and signs involving the nervous system 07/29/2019    Suspected sleep apnea    Personal history of urinary calculi 04/25/2014    Personal history of renal calculi    Unilateral inguinal hernia, without obstruction or gangrene, not specified as recurrent 10/13/2014    Hernia, inguinal, left     Past Surgical History:   Procedure Laterality Date    COLONOSCOPY  05/23/2018    Complete Colonoscopy    HERNIA REPAIR  09/29/2015    Hernia Repair Inguinal Sliding    REFRACTIVE SURGERY  05/23/2018    Corneal LASIK    VASECTOMY  04/25/2014    Surgery Vas Deferens Vasectomy       Medications  Current Outpatient Medications on File Prior to Visit   Medication Sig Dispense Refill    buPROPion (Wellbutrin) 75 mg tablet Take 1 tablet (75 mg) by mouth once daily. 90 tablet 3    cholecalciferol (Vitamin D-3) 50 MCG (2000 UT) tablet Take 1 tablet (2,000 Units) by mouth once  daily.      fluticasone (Flonase) 50 mcg/actuation nasal spray Administer into affected nostril(s).      ibuprofen (Motrin) capsule Take by mouth.      tamsulosin (Flomax) 0.4 mg 24 hr capsule TAKE 1 CAPSULE BY MOUTH AT BEDTIME 90 capsule 3     No current facility-administered medications on file prior to visit.       Objective   Physicial Exam  General: Well developed, well nourished, alert and cooperative, appears in no acute distress  Eyes: Non-injected conjunctiva, sclera clear, no proptosis  Cardiac: Extremities are warm and well perfused. No edema, cyanosis or pallor.   Lungs: Breathing is easy, non-labored. Speaking in clear and complete sentences. Normal diaphragmatic movement.  MSK: Ambulatory with steady gait, unassisted  Neuro: alert and oriented to person, place and time  Psych: Demonstrates good judgement and reason, without hallucinations, abnormal affect or abnormal behaviors.  Skin: no obvious lesions, no rashes.    No visits with results within 30 Day(s) from this visit.   Latest known visit with results is:   Lab on 11/29/2023   Component Date Value Ref Range Status    Prostate Specific AG 11/29/2023 0.84  <=4.00 ng/mL Final      Review of Systems  All other systems have been reviewed and are negative for complaint.      Assessment and Plan   BPH with LUTS   Continue Tamsulosin 0.4 mg at bedtime.     Please obtain PSA now, I will call with results.   Sending Urine for micro and culture if indicated. Will call with results.      Patient will follow up yearly, sooner if needed.     All questions and concerns were addressed. Patient verbalizes understanding and has no other questions at this time.     Latrice Cash-- ROSEMARIE LYONS  Office Phone:  [phone]

## 2024-11-29 LAB — BACTERIA UR CULT: NO GROWTH

## 2024-12-09 ENCOUNTER — LAB (OUTPATIENT)
Dept: LAB | Facility: LAB | Age: 64
End: 2024-12-09
Payer: COMMERCIAL

## 2024-12-09 DIAGNOSIS — Z12.5 SCREENING FOR PROSTATE CANCER: ICD-10-CM

## 2024-12-09 PROCEDURE — 36415 COLL VENOUS BLD VENIPUNCTURE: CPT

## 2024-12-09 PROCEDURE — 84153 ASSAY OF PSA TOTAL: CPT

## 2024-12-10 LAB — PSA SERPL-MCNC: 0.96 NG/ML

## 2024-12-19 ENCOUNTER — OFFICE VISIT (OUTPATIENT)
Dept: OPHTHALMOLOGY | Age: 64
End: 2024-12-19
Payer: COMMERCIAL

## 2024-12-19 DIAGNOSIS — H11.121 CONJUNCTIVAL CONCRETIONS OF RIGHT EYE: Primary | ICD-10-CM

## 2024-12-19 PROCEDURE — 99213 OFFICE O/P EST LOW 20 MIN: CPT | Performed by: OPHTHALMOLOGY

## 2024-12-19 ASSESSMENT — ENCOUNTER SYMPTOMS
ENDOCRINE NEGATIVE: 0
RESPIRATORY NEGATIVE: 0
PSYCHIATRIC NEGATIVE: 0
CONSTITUTIONAL NEGATIVE: 0
ALLERGIC/IMMUNOLOGIC NEGATIVE: 0
NEUROLOGICAL NEGATIVE: 0
CARDIOVASCULAR NEGATIVE: 0
GASTROINTESTINAL NEGATIVE: 0
EYES NEGATIVE: 1
HEMATOLOGIC/LYMPHATIC NEGATIVE: 0
MUSCULOSKELETAL NEGATIVE: 0

## 2024-12-19 ASSESSMENT — VISUAL ACUITY
OD_PH_SC: 20/30
OS_SC: 20/200
OS_PH_SC: 20/70
OD_SC: 20/70
METHOD: SNELLEN - LINEAR

## 2024-12-19 ASSESSMENT — EXTERNAL EXAM - LEFT EYE: OS_EXAM: NORMAL

## 2024-12-19 ASSESSMENT — EXTERNAL EXAM - RIGHT EYE: OD_EXAM: NORMAL

## 2024-12-19 ASSESSMENT — TONOMETRY
OD_IOP_MMHG: 19
IOP_METHOD: GOLDMANN APPLANATION
OS_IOP_MMHG: 20

## 2024-12-19 NOTE — PROGRESS NOTES
Assessment/Plan   Diagnoses and all orders for this visit:  Conjunctival concretions of right eye    Patient with a history of Rks, CL wearer, complaining about pain/watering in the left eye for the last week.    Exposed Conjunctival concretion removed with a needle at the Slit lamp. Has central pseudodendritic stain uptake?    Plan:  Ats QID  Warm compresses  Stay out of the CL in the left eye, see next week to reassess the cornea stain uptake.

## 2024-12-26 ENCOUNTER — APPOINTMENT (OUTPATIENT)
Dept: OPHTHALMOLOGY | Facility: CLINIC | Age: 64
End: 2024-12-26
Payer: COMMERCIAL

## 2024-12-26 DIAGNOSIS — H25.812 COMBINED FORMS OF AGE-RELATED CATARACT OF LEFT EYE: ICD-10-CM

## 2024-12-26 DIAGNOSIS — H11.121 CONJUNCTIVAL CONCRETIONS OF RIGHT EYE: Primary | ICD-10-CM

## 2024-12-26 DIAGNOSIS — Z98.890 HISTORY OF LASER REFRACTIVE SURGERY: ICD-10-CM

## 2024-12-26 PROCEDURE — 99212 OFFICE O/P EST SF 10 MIN: CPT | Performed by: OPHTHALMOLOGY

## 2024-12-26 ASSESSMENT — VISUAL ACUITY
OD_SC+: -2
OS_PH_SC: 20/50
OD_PH_SC+: -2
METHOD: SNELLEN - LINEAR
OS_PH_SC+: -2
OD_PH_SC: 20/30
OS_SC: 20/300
OD_SC: 20/40

## 2024-12-26 ASSESSMENT — EXTERNAL EXAM - LEFT EYE: OS_EXAM: NORMAL

## 2024-12-26 ASSESSMENT — ENCOUNTER SYMPTOMS: EYES NEGATIVE: 1

## 2024-12-26 ASSESSMENT — EXTERNAL EXAM - RIGHT EYE: OD_EXAM: NORMAL

## 2024-12-30 NOTE — PROGRESS NOTES
Assessment/Plan   Diagnoses and all orders for this visit:  Conjunctival concretions of right eye    Patient with a history of Rks, CL wearer, complaining about pain/watering in the left eye for the last week.    Feels better after Concretion removal in the right eye, no stain uptake in the left eye.    Plan:  Ats QID  Warm compresses  Follow up with Dr. Leiva as scheduled.

## 2025-01-30 PROCEDURE — RXMED WILLOW AMBULATORY MEDICATION CHARGE

## 2025-01-31 ENCOUNTER — PHARMACY VISIT (OUTPATIENT)
Dept: PHARMACY | Facility: CLINIC | Age: 65
End: 2025-01-31
Payer: COMMERCIAL

## 2025-04-24 PROCEDURE — RXMED WILLOW AMBULATORY MEDICATION CHARGE

## 2025-04-25 ENCOUNTER — PHARMACY VISIT (OUTPATIENT)
Dept: PHARMACY | Facility: CLINIC | Age: 65
End: 2025-04-25
Payer: COMMERCIAL

## 2025-06-12 ENCOUNTER — APPOINTMENT (OUTPATIENT)
Dept: SLEEP MEDICINE | Facility: HOSPITAL | Age: 65
End: 2025-06-12
Payer: COMMERCIAL

## 2025-06-19 ENCOUNTER — OFFICE VISIT (OUTPATIENT)
Dept: SLEEP MEDICINE | Facility: HOSPITAL | Age: 65
End: 2025-06-19
Payer: COMMERCIAL

## 2025-06-19 VITALS
WEIGHT: 188 LBS | OXYGEN SATURATION: 96 % | RESPIRATION RATE: 18 BRPM | BODY MASS INDEX: 25.5 KG/M2 | SYSTOLIC BLOOD PRESSURE: 141 MMHG | DIASTOLIC BLOOD PRESSURE: 95 MMHG | TEMPERATURE: 96.5 F | HEART RATE: 84 BPM

## 2025-06-19 DIAGNOSIS — G47.19 EXCESSIVE DAYTIME SLEEPINESS: ICD-10-CM

## 2025-06-19 DIAGNOSIS — G47.33 OSA (OBSTRUCTIVE SLEEP APNEA): Primary | ICD-10-CM

## 2025-06-19 PROCEDURE — 99214 OFFICE O/P EST MOD 30 MIN: CPT | Performed by: STUDENT IN AN ORGANIZED HEALTH CARE EDUCATION/TRAINING PROGRAM

## 2025-06-19 PROCEDURE — 99213 OFFICE O/P EST LOW 20 MIN: CPT | Performed by: STUDENT IN AN ORGANIZED HEALTH CARE EDUCATION/TRAINING PROGRAM

## 2025-06-19 ASSESSMENT — PAIN SCALES - GENERAL: PAINLEVEL_OUTOF10: 0-NO PAIN

## 2025-06-19 NOTE — ASSESSMENT & PLAN NOTE
- doing well with PAP therapy  - detailed download reviewed and documented below.  The download was discussed with patient.  The issues and benefits from CPAP are also documented in chart  - changed setting to 8-12 cwp, without RAMP  - renew PAP supply orders

## 2025-06-19 NOTE — PROGRESS NOTES
Patient: Mario Alberto Trejo    [id number]  : 1960 -- AGE 64 y.o.    Provider: Jf Story MD     Location [facility]   Service Date: 2025              [facility]  Followup Visit Note     Assessment/Plan   Mr. Trejo is a 64 y.o. male and he returns in followup to the [facility] for the problems listed below on 25   Problem List, Orders, Assessment, Recommendations:  Problem List Items Addressed This Visit           ICD-10-CM    BJ (obstructive sleep apnea) - Primary G47.33    - doing well with PAP therapy  - detailed download reviewed and documented below.  The download was discussed with patient.  The issues and benefits from CPAP are also documented in chart  - changed setting to 8-12 cwp, without RAMP  - renew PAP supply orders           Relevant Orders    Positive Airway Pressure (PAP) Therapy    Follow Up In Adult Sleep Medicine    Excessive daytime sleepiness G47.19    Better now and does not want to add on stimulants or WPA.  He feels like he can manage it fine.          Disposition  Return to clinic in 12 months          HISTORY OF PRESENT ILLNESS     HISTORY OF PRESENT ILLNESS   Mario Alberto Trejo is a 64 y.o. male with history of BJ presents to [facility] for followup.     Assessment and plan from last visit: 2024  Mr. Trejo is a 63 y.o. male and he returns in followup to the [facility] for BJ and Anxiety.     Problem List, Orders, Assessment, Recommendations:  Problem List Items Addressed This Visit               ICD-10-CM     BJ (obstructive sleep apnea) G47.33       - doing well with PAP therapy  - continue current setting  - renew PAP supply orders              Relevant Orders     Positive Airway Pressure (PAP) Therapy     Anxiety - Primary F41.9       Renew wellbutrin           Relevant Medications     buPROPion (Wellbutrin) 75 mg  tablet     Excessive daytime sleepiness G47.19       Still reports some EDS despite use of CPAP.  He felt like CPAP helped but he is still not 100%.       We discussed about the use of stimulants or WPA in much detail and he wants to think on it and maybe get some feedback from spouse and coworkers, friends and see if he wants to go this route               Disposition     Return to clinic in 12 months    Current History    History of Present Illness  He manages his sleep apnea with a CPAP machine, initially experiencing difficulty with the ramp-up period. Once the machine reaches full strength, he feels better. He uses the CPAP machine regularly without significant issues.    He experiences persistent daytime sleepiness, which he manages with caffeine, consuming coffee in the morning and a little in the afternoon. This does not affect his ability to fall asleep at night, although he wakes up several times during the night. He has stopped taking Wellbutrin, which was previously tried to improve alertness, as it did not result in a significant change.    He uses a nasal spray for seasonal allergies, which helps manage congestion, especially during warm weather months.    He gets up several times a night, which is being managed by urology with Flomax. No issues with caffeine affecting his sleep. His father had BPH issues, requiring multiple surgeries to achieve satisfactory results.    PAP Info  [employer]: [employer]  Issues with PAP Therapy?: None  Perceived Benefits of PAP therapy: refreshing sleep    PAP Adherence  PAP Adherence : A PAP adherence download was obtained and data was reviewed personally today in clinic., Screenshot of PAP download is attached below    Daytime Symptoms  Patient reports: no daytime symptoms  Patient denies: excessive daytime sleepiness  Napping habit: Patient denies taking naps.  Fatigue concerns: Patient denies feeling fatigue    PHYSICAL EXAM     VITAL SIGNS: BP (!)  141/95 (BP Location: Right arm, Patient Position: Sitting, BP Cuff Size: Adult)   Pulse 84   Temp 35.8 °C (96.5 °F) (Temporal)   Resp 18   Wt 85.3 kg (188 lb)   SpO2 96%   BMI 25.50 kg/m²      PREVIOUS WEIGHTS:  Wt Readings from Last 3 Encounters:   06/19/25 85.3 kg (188 lb)   06/06/24 86.2 kg (190 lb)   08/24/23 83.9 kg (185 lb)     This medical note was created with the assistance of artificial intelligence (AI) for documentation purposes. The content has been reviewed and confirmed by the healthcare provider for accuracy and completeness. Patient consented to the use of audio recording and use of AI during their visit.

## 2025-06-20 ENCOUNTER — TELEPHONE (OUTPATIENT)
Dept: OPHTHALMOLOGY | Facility: CLINIC | Age: 65
End: 2025-06-20
Payer: COMMERCIAL

## 2025-06-24 ENCOUNTER — APPOINTMENT (OUTPATIENT)
Dept: OPHTHALMOLOGY | Facility: CLINIC | Age: 65
End: 2025-06-24
Payer: COMMERCIAL

## 2025-06-25 ENCOUNTER — TELEPHONE (OUTPATIENT)
Dept: OPHTHALMOLOGY | Age: 65
End: 2025-06-25
Payer: COMMERCIAL

## 2025-07-23 DIAGNOSIS — N40.1 BPH WITH OBSTRUCTION/LOWER URINARY TRACT SYMPTOMS: ICD-10-CM

## 2025-07-23 DIAGNOSIS — N13.8 BPH WITH OBSTRUCTION/LOWER URINARY TRACT SYMPTOMS: ICD-10-CM

## 2025-07-23 PROCEDURE — RXMED WILLOW AMBULATORY MEDICATION CHARGE

## 2025-07-23 RX ORDER — TAMSULOSIN HYDROCHLORIDE 0.4 MG/1
0.4 CAPSULE ORAL NIGHTLY
Qty: 90 CAPSULE | Refills: 0 | Status: SHIPPED | OUTPATIENT
Start: 2025-07-23 | End: 2026-07-23

## 2025-07-25 ENCOUNTER — PHARMACY VISIT (OUTPATIENT)
Dept: PHARMACY | Facility: CLINIC | Age: 65
End: 2025-07-25
Payer: COMMERCIAL

## 2025-09-26 ENCOUNTER — APPOINTMENT (OUTPATIENT)
Dept: OPHTHALMOLOGY | Facility: CLINIC | Age: 65
End: 2025-09-26
Payer: COMMERCIAL

## 2025-12-03 ENCOUNTER — APPOINTMENT (OUTPATIENT)
Dept: UROLOGY | Facility: CLINIC | Age: 65
End: 2025-12-03
Payer: COMMERCIAL